# Patient Record
Sex: MALE | Employment: FULL TIME | URBAN - METROPOLITAN AREA
[De-identification: names, ages, dates, MRNs, and addresses within clinical notes are randomized per-mention and may not be internally consistent; named-entity substitution may affect disease eponyms.]

---

## 2023-07-14 ENCOUNTER — OFFICE VISIT (OUTPATIENT)
Dept: OTOLARYNGOLOGY | Facility: CLINIC | Age: 58
End: 2023-07-14

## 2023-07-14 ENCOUNTER — OFFICE VISIT (OUTPATIENT)
Dept: AUDIOLOGY | Facility: CLINIC | Age: 58
End: 2023-07-14

## 2023-07-14 VITALS — BODY MASS INDEX: 35.21 KG/M2 | WEIGHT: 260 LBS | HEIGHT: 72 IN

## 2023-07-14 DIAGNOSIS — H90.3 SENSORINEURAL HEARING LOSS (SNHL), BILATERAL: ICD-10-CM

## 2023-07-14 DIAGNOSIS — J34.89 SINUS PRESSURE: ICD-10-CM

## 2023-07-14 DIAGNOSIS — H91.90 HEARING LOSS, UNSPECIFIED HEARING LOSS TYPE, UNSPECIFIED LATERALITY: Primary | ICD-10-CM

## 2023-07-14 DIAGNOSIS — J34.2 NASAL SEPTAL DEVIATION: ICD-10-CM

## 2023-07-14 DIAGNOSIS — H90.3 SENSORINEURAL HEARING LOSS, BILATERAL: ICD-10-CM

## 2023-07-14 DIAGNOSIS — R51.9 SINUS HEADACHE: ICD-10-CM

## 2023-07-14 DIAGNOSIS — R09.81 NASAL CONGESTION: ICD-10-CM

## 2023-07-14 DIAGNOSIS — R51.9 RIGHT SIDED FACIAL PAIN: Primary | ICD-10-CM

## 2023-07-14 PROBLEM — K21.9 GASTROESOPHAGEAL REFLUX DISEASE: Status: ACTIVE | Noted: 2023-07-14

## 2023-07-14 PROBLEM — I10 HYPERTENSION: Status: ACTIVE | Noted: 2023-07-14

## 2023-07-14 PROBLEM — K76.0 STEATOSIS OF LIVER: Status: ACTIVE | Noted: 2023-07-14

## 2023-07-14 PROCEDURE — 92567 TYMPANOMETRY: CPT | Performed by: AUDIOLOGIST

## 2023-07-14 PROCEDURE — 31231 NASAL ENDOSCOPY DX: CPT | Performed by: OTOLARYNGOLOGY

## 2023-07-14 PROCEDURE — 3008F BODY MASS INDEX DOCD: CPT | Performed by: OTOLARYNGOLOGY

## 2023-07-14 PROCEDURE — 92557 COMPREHENSIVE HEARING TEST: CPT | Performed by: AUDIOLOGIST

## 2023-07-14 PROCEDURE — 99203 OFFICE O/P NEW LOW 30 MIN: CPT | Performed by: OTOLARYNGOLOGY

## 2023-07-14 RX ORDER — NEOMYCIN SULFATE, POLYMYXIN B SULFATE, HYDROCORTISONE 3.5; 10000; 1 MG/ML; [USP'U]/ML; MG/ML
SOLUTION/ DROPS AURICULAR (OTIC)
COMMUNITY
Start: 2023-05-31

## 2023-07-14 RX ORDER — IBUPROFEN 600 MG/1
600 TABLET ORAL
COMMUNITY
Start: 2022-02-11

## 2023-07-14 RX ORDER — DOXYCYCLINE 100 MG/1
100 CAPSULE ORAL 2 TIMES DAILY
COMMUNITY
Start: 2023-07-06

## 2023-07-14 RX ORDER — FLUTICASONE PROPIONATE 50 MCG
1 SPRAY, SUSPENSION (ML) NASAL 2 TIMES DAILY
COMMUNITY
Start: 2023-07-06

## 2023-07-14 NOTE — PROGRESS NOTES
NEW PATIENT PROGRESS NOTE  OTOLOGY/OTOLARYNGOLOGY    REF MD:  Asher Spivey  [address],  [address]    PCP: Brent LANCE    CHIEF COMPLAINT:  Patient presents with:  Nose Problem: Difficulty breathing  Congestion in ears, and nose x 1 month,   Pt finished 3rd round of oral abx and steroid medication with no resolve. Ear Problem: Blood in right ear     HISTORY OF PRESENT ILLNESS: Scot Corrigan is a 63year old male who presents for evaluation of ear problem. 1.5 months ago used a q-tip in the right ear. Had bleeding in the right ear. Ear drops and antibiotics have not helped. Tried multiple oral antibiotics and oral steroids. Patient feels like the problem has progressed to involve the right sinuses. He has pain and pressure in the right face. He has right facial congestion. Ear feels very plugged. Having a sinus headache - advil helps. Does not have migraine headache. Denies light sensitivity. Denies vertigo, dizziness, tinnitus, previous otologic procedure. PAST MEDICAL HISTORY:    Past Medical History:   Diagnosis Date    Cancer ([facility] [address]     Cholelithiasis     Esophageal reflux     Essential hypertension     Hyperlipidemia        PAST SURGICAL HISTORY:    Past Surgical History:   Procedure Laterality Date    COLONOSCOPY      OTHER SURGICAL HISTORY Left     testicle removal       ibuprofen 600 MG Oral Tab, 1 tablet (600 mg total). , Disp: , Rfl:   Doxycycline Monohydrate 100 MG Oral Cap, Take 1 capsule (100 mg total) by mouth 2 (two) times daily. , Disp: , Rfl:   fluticasone propionate 50 MCG/ACT Nasal Suspension, 1 spray by Nasal route 2 (two) times daily. , Disp: , Rfl:   Neomycin-Polymyxin-HC 1 % Otic Solution, INSTILL 4 DROPS INTO AFFECTED EAR THREE TIMES A DAY FOR 7 DAYS (Patient not taking: Reported on 7/14/2023), Disp: , Rfl:     No current facility-administered medications on file prior to visit.       Allergies:   Erythromycin            HIVES    SOCIAL HISTORY:  Social History    Tobacco Use Smoking status: Never      Smokeless tobacco: Never    Alcohol use: Never      FAMILY HISTORY: Denies known family history of hearing loss, tinnitus, vertigo, or migraine. Denies known family history of head and neck cancer, thyroid cancer, bleeding disorders. REVIEW OF SYSTEMS:   Positives are in bold  Neuro: Headache, facial weakness, facial numbness, neck pain, vertigo  ENT: Hearing change, tinnitus, otorrhea, otalgia, aural fullness, ear pressure, vertigo, imbalance  Sinus pressure, rhinorrhea, congestion, facial pain, jaw pain, dysphagia, odynophagia, sore throat, voice changes, shortness of breath    EXAMINATION:  I washed my hands with an alcohol-based hand gel prior to examination  Constitutional:   --Vitals: Height 6' (1.829 m), weight 260 lb (117.9 kg). General: no apparent distress, well-developed, conversant  Psych: affect pleasant and appropriate for age, alert and oriented  Neuro: Facial movement normal bilateral  Respiratory: No stridor, stertor or increased work of breathing  ENT:  --Nose: no external nasal deformity, anterior rhinoscopy: Septum with inferior septal deviations more prominent on the left, no inferior turbinate hypertrophy, mucosa healthy, no rhinorrhea  --Neck: No palpable cervical lymphadenopathy, no thyromegaly, no masses or lesions over the bilateral submandibular or parotid glands  --Ear: (bilateral ears were examined under binocular microscopy)  Right ear microscopic exam:  Pinna: Normal, no lesions or masses. Mastoid: Nontender on palpation. External auditory canal: Clear, no masses or lesions. Tympanic membrane: Intact, no lesions, normal landmarks. Middle ear: Aerated. Left ear microscopic exam:  Pinna: Normal, no lesions or masses. Mastoid: Nontender on palpation. External auditory canal: Clear, no masses or lesions. Tympanic membrane: Intact, no lesions, normal landmarks. Middle ear: Aerated.     Nasal endoscopy (date 07/14/23)  Verbal consent obtained, patient was correctly identified  Topical anesthesia with aerosolized lidocaine and neosynepherine spray in the bilateral nares  Findings:   Right: No mucous throughout. Normal mucosa. Inferior turbinate is non-hypertrophic. Middle meatus is without polyps, purulence, masses. Middle turbinate is well formed and normal appearing. Sphenoethmoid recess is without polyps, purulence, masses. Left: No mucous throughout. Normal mucosa. Inferior turbinate is non-hypertrophic. Middle meatus is without polyps, purulence, masses. Middle turbinate is well formed and normal appearing. Sphenoethmoid recess is without polyps, purulence, masses. Septum: Septum with inferior septal deviations more prominent on the left  Nasopharynx: No masses, bilateral eustachian tubes are patent. The bilateral fossae of Rosenmueller are clear. Latest Audiogram Result (Hz) Exam performed: 7/14/2023 3:06 PM Last edited by Sudhir Hilton MS, CCC-A on 7/14/2023 3:20 PM        125 250  1500 2000 3000 4000 6000 8000    Right air:  15 5  10  5 10 30 40 15    Left air:  15 10  15  10 20 40 35 35    Left mastoid bone:   5  10  10 15 35         Reliability:  Good    Transducer:   Inserts    Technique:  Conventional Audiometry    Comments:            Latest Speech Audiometry  Last edited by Sudhir Hilton MS, CCC-A on 7/14/2023 3:20 PM       Ear Method SAT SRT MCL UCL Notes    right   5       left   5        Ear Method Test/List Score (%) Intensity Mask/Noise   right   100 45    left   100 45                      Latest Tympanogram Result       Probe Tone (Hz): 226 Exam performed: 7/14/2023 3:06 PM Last edited by Sudhir Hilton MS, CCC-A on 7/14/2023 3:20 PM      Tympanograms  These were drawn by a user, not generated from device data      Right Ear Left Ear                     Right Ear Left Ear    Tympanogram type: Type A Type A    Canal volume (mL): 2.8 2.4    Peak pressure (daPa): -5 -5    Peak amplitude (mL): 0.7 0.45    Tympanogram width (daPa): Comments:                    Latest Audiogram and Tympanogram Result Text  Last edited by Yogi Broussard, MS, CCC-A on 7/14/2023  3:20 PM      Study Result                 Narrative & Impression  Decreased hearing. F/u with Dr. Lesle Aschoff today. ASSESSMENT/PLAN:  Chris Blake is a 63year old male with   (R51.9) Right sided facial pain  (primary encounter diagnosis)  (J34.89) Sinus pressure [J34.89 (ICD-10-CM)]  (H90.3) Sensorineural hearing loss (SNHL), bilateral  (R51.9) Sinus headache  (R09.81) Nasal congestion  (J34.2) Nasal septal deviation     IMPRESSION:  -Normal ear exam today - discussed that the ear canal is not connected to the sinuses. So an ear infection of the ear canal cannot travel to the sinuses. No residual ear trauma. -Mild high frequency SNHL  -Possible chronic sinusitis   -Leftward nasal septal deviation  -Discussed that sinus pain, pressure, and headache can actually be signs of migraine. Will consider this pending CT evaluation of the sinuses. PLAN:  -Recommend saline nasal irrigations  -Recommend CT sinus w/o contrast  -Lifestyle changes including stress reduction, migraine diet (caffeine cessation), sleep hygiene, hydration, regular meals discussed  -RTC after imaging     Situation reviewed with the patient in detail. Olivia Victoria MD  Otology/Otolaryngology  [facility]   [address] [facility]  Phone [phone]  Fax [phone]